# Patient Record
Sex: FEMALE | Race: WHITE | Employment: FULL TIME | URBAN - METROPOLITAN AREA
[De-identification: names, ages, dates, MRNs, and addresses within clinical notes are randomized per-mention and may not be internally consistent; named-entity substitution may affect disease eponyms.]

---

## 2023-08-26 PROBLEM — J45.909 REACTIVE AIRWAY DISEASE (HHS-HCC): Status: ACTIVE | Noted: 2023-08-26

## 2023-08-26 PROBLEM — Z00.00 ROUTINE ADULT HEALTH MAINTENANCE: Status: ACTIVE | Noted: 2023-08-26

## 2023-08-26 PROBLEM — E55.9 VITAMIN D DEFICIENCY: Status: ACTIVE | Noted: 2023-08-26

## 2023-09-15 LAB
ALANINE AMINOTRANSFERASE (SGPT) (U/L) IN SER/PLAS: 11 U/L (ref 7–45)
ALBUMIN (G/DL) IN SER/PLAS: 4.3 G/DL (ref 3.4–5)
ALKALINE PHOSPHATASE (U/L) IN SER/PLAS: 43 U/L (ref 33–110)
ANION GAP IN SER/PLAS: 12 MMOL/L (ref 10–20)
APPEARANCE, URINE: CLEAR
ASPARTATE AMINOTRANSFERASE (SGOT) (U/L) IN SER/PLAS: 17 U/L (ref 9–39)
BACTERIA, URINE: ABNORMAL /HPF
BASOPHILS (10*3/UL) IN BLOOD BY AUTOMATED COUNT: 0.07 X10E9/L (ref 0–0.1)
BASOPHILS/100 LEUKOCYTES IN BLOOD BY AUTOMATED COUNT: 1.3 % (ref 0–2)
BILIRUBIN TOTAL (MG/DL) IN SER/PLAS: 0.6 MG/DL (ref 0–1.2)
BILIRUBIN, URINE: NEGATIVE
BLOOD, URINE: NEGATIVE
CALCIDIOL (25 OH VITAMIN D3) (NG/ML) IN SER/PLAS: 84 NG/ML
CALCIUM (MG/DL) IN SER/PLAS: 9.3 MG/DL (ref 8.6–10.3)
CARBON DIOXIDE, TOTAL (MMOL/L) IN SER/PLAS: 30 MMOL/L (ref 21–32)
CHLORIDE (MMOL/L) IN SER/PLAS: 101 MMOL/L (ref 98–107)
CHOLESTEROL (MG/DL) IN SER/PLAS: 202 MG/DL (ref 0–199)
CHOLESTEROL IN HDL (MG/DL) IN SER/PLAS: 73.5 MG/DL
CHOLESTEROL/HDL RATIO: 2.7
COLOR, URINE: YELLOW
CREATININE (MG/DL) IN SER/PLAS: 0.63 MG/DL (ref 0.5–1.05)
EOSINOPHILS (10*3/UL) IN BLOOD BY AUTOMATED COUNT: 0.19 X10E9/L (ref 0–0.7)
EOSINOPHILS/100 LEUKOCYTES IN BLOOD BY AUTOMATED COUNT: 3.4 % (ref 0–6)
ERYTHROCYTE DISTRIBUTION WIDTH (RATIO) BY AUTOMATED COUNT: 12.1 % (ref 11.5–14.5)
ERYTHROCYTE MEAN CORPUSCULAR HEMOGLOBIN CONCENTRATION (G/DL) BY AUTOMATED: 32 G/DL (ref 32–36)
ERYTHROCYTE MEAN CORPUSCULAR VOLUME (FL) BY AUTOMATED COUNT: 95 FL (ref 80–100)
ERYTHROCYTES (10*6/UL) IN BLOOD BY AUTOMATED COUNT: 4.58 X10E12/L (ref 4–5.2)
GFR FEMALE: >90 ML/MIN/1.73M2
GLUCOSE (MG/DL) IN SER/PLAS: 73 MG/DL (ref 74–99)
GLUCOSE, URINE: NEGATIVE MG/DL
HEMATOCRIT (%) IN BLOOD BY AUTOMATED COUNT: 43.5 % (ref 36–46)
HEMOGLOBIN (G/DL) IN BLOOD: 13.9 G/DL (ref 12–16)
IMMATURE GRANULOCYTES/100 LEUKOCYTES IN BLOOD BY AUTOMATED COUNT: 0.2 % (ref 0–0.9)
KETONES, URINE: NEGATIVE MG/DL
LDL: 116 MG/DL (ref 0–99)
LEUKOCYTE ESTERASE, URINE: ABNORMAL
LEUKOCYTES (10*3/UL) IN BLOOD BY AUTOMATED COUNT: 5.5 X10E9/L (ref 4.4–11.3)
LYMPHOCYTES (10*3/UL) IN BLOOD BY AUTOMATED COUNT: 2.03 X10E9/L (ref 1.2–4.8)
LYMPHOCYTES/100 LEUKOCYTES IN BLOOD BY AUTOMATED COUNT: 36.8 % (ref 13–44)
MONOCYTES (10*3/UL) IN BLOOD BY AUTOMATED COUNT: 0.5 X10E9/L (ref 0.1–1)
MONOCYTES/100 LEUKOCYTES IN BLOOD BY AUTOMATED COUNT: 9.1 % (ref 2–10)
NEUTROPHILS (10*3/UL) IN BLOOD BY AUTOMATED COUNT: 2.71 X10E9/L (ref 1.2–7.7)
NEUTROPHILS/100 LEUKOCYTES IN BLOOD BY AUTOMATED COUNT: 49.2 % (ref 40–80)
NITRITE, URINE: NEGATIVE
PH, URINE: 6 (ref 5–8)
PLATELETS (10*3/UL) IN BLOOD AUTOMATED COUNT: 256 X10E9/L (ref 150–450)
POTASSIUM (MMOL/L) IN SER/PLAS: 3.9 MMOL/L (ref 3.5–5.3)
PROTEIN TOTAL: 6.9 G/DL (ref 6.4–8.2)
PROTEIN, URINE: NEGATIVE MG/DL
RBC, URINE: <1 /HPF (ref 0–5)
SODIUM (MMOL/L) IN SER/PLAS: 139 MMOL/L (ref 136–145)
SPECIFIC GRAVITY, URINE: 1.01 (ref 1–1.03)
SQUAMOUS EPITHELIAL CELLS, URINE: 3 /HPF
THYROTROPIN (MIU/L) IN SER/PLAS BY DETECTION LIMIT <= 0.05 MIU/L: 2.85 MIU/L (ref 0.44–3.98)
TRIGLYCERIDE (MG/DL) IN SER/PLAS: 63 MG/DL (ref 0–149)
UREA NITROGEN (MG/DL) IN SER/PLAS: 11 MG/DL (ref 6–23)
UROBILINOGEN, URINE: <2 MG/DL (ref 0–1.9)
VLDL: 13 MG/DL (ref 0–40)
WBC, URINE: 1 /HPF (ref 0–5)

## 2023-09-25 ASSESSMENT — PROMIS GLOBAL HEALTH SCALE
RATE_AVERAGE_PAIN: 0
RATE_PHYSICAL_HEALTH: EXCELLENT
EMOTIONAL_PROBLEMS: NEVER
CARRYOUT_PHYSICAL_ACTIVITIES: COMPLETELY
RATE_SOCIAL_SATISFACTION: EXCELLENT
RATE_MENTAL_HEALTH: EXCELLENT
RATE_QUALITY_OF_LIFE: EXCELLENT
RATE_GENERAL_HEALTH: EXCELLENT
CARRYOUT_SOCIAL_ACTIVITIES: EXCELLENT

## 2023-09-29 ENCOUNTER — OFFICE VISIT (OUTPATIENT)
Dept: PRIMARY CARE | Facility: CLINIC | Age: 48
End: 2023-09-29
Payer: COMMERCIAL

## 2023-09-29 VITALS
HEART RATE: 63 BPM | SYSTOLIC BLOOD PRESSURE: 124 MMHG | WEIGHT: 113 LBS | HEIGHT: 62 IN | BODY MASS INDEX: 20.8 KG/M2 | DIASTOLIC BLOOD PRESSURE: 80 MMHG

## 2023-09-29 DIAGNOSIS — Z12.31 ENCOUNTER FOR SCREENING MAMMOGRAM FOR BREAST CANCER: ICD-10-CM

## 2023-09-29 DIAGNOSIS — Z82.49 FAMILY HISTORY OF CORONARY ARTERY DISEASE IN FATHER: ICD-10-CM

## 2023-09-29 DIAGNOSIS — Z00.00 ROUTINE ADULT HEALTH MAINTENANCE: Primary | ICD-10-CM

## 2023-09-29 DIAGNOSIS — Z13.6 SCREENING FOR CARDIOVASCULAR CONDITION: ICD-10-CM

## 2023-09-29 DIAGNOSIS — Z23 NEED FOR VACCINATION: ICD-10-CM

## 2023-09-29 DIAGNOSIS — E55.9 VITAMIN D DEFICIENCY: ICD-10-CM

## 2023-09-29 PROCEDURE — 90686 IIV4 VACC NO PRSV 0.5 ML IM: CPT | Performed by: INTERNAL MEDICINE

## 2023-09-29 PROCEDURE — 90471 IMMUNIZATION ADMIN: CPT | Performed by: INTERNAL MEDICINE

## 2023-09-29 PROCEDURE — 1036F TOBACCO NON-USER: CPT | Performed by: INTERNAL MEDICINE

## 2023-09-29 PROCEDURE — 99396 PREV VISIT EST AGE 40-64: CPT | Performed by: INTERNAL MEDICINE

## 2023-09-29 RX ORDER — CHOLECALCIFEROL (VITAMIN D3) 125 MCG
125 CAPSULE ORAL DAILY
COMMUNITY
Start: 2018-11-29

## 2023-09-29 ASSESSMENT — PATIENT HEALTH QUESTIONNAIRE - PHQ9
1. LITTLE INTEREST OR PLEASURE IN DOING THINGS: NOT AT ALL
2. FEELING DOWN, DEPRESSED OR HOPELESS: NOT AT ALL
SUM OF ALL RESPONSES TO PHQ9 QUESTIONS 1 AND 2: 0

## 2023-09-29 NOTE — ASSESSMENT & PLAN NOTE
Annual Wellness exam completed   Preventive Health history reviewed:  Labs ordered    Mammogram ordered  Flu vaccine

## 2023-09-29 NOTE — PROGRESS NOTES
Reason for Visit: Annual Physical Exam    Assessment and Plan:  Problem List Items Addressed This Visit          High    Routine adult health maintenance - Primary    Overview     Influenza 12/2/19, 9/24/20  Moderna COVID 19 vaccine 1/16/21, 2/14/21, 11/13/21  MMR 8/22/91  Hep B 10/18/18, 11/18/18. 4/18/19  Hepatitis A 10/6/2010, 11/21/02  Yellow Fever and Typhoid vaccines  11/21/02  PAP/HPV 11/6/17 (-); 9/2022 (-); 9/16/22 (-)  Mamm 1/18 (-), 1/18/19; 1/20, 1/28/21, 1/7/22, 1/17/23  TDaP 2010, 12/2/19  PPD (-) 8/29/22  Cscope 8/30/22 (10yrs)         Current Assessment & Plan     Annual Wellness exam completed   Preventive Health history reviewed:  Labs ordered    Mammogram ordered  Flu vaccine            Relevant Orders    Urinalysis with Reflex Microscopic    TSH with reflex to Free T4 if abnormal    Comprehensive Metabolic Panel    CBC and Auto Differential    Lipid Panel       Medium    Encounter for screening mammogram for breast cancer    Relevant Orders    BI mammo bilateral screening tomosynthesis    Vitamin D deficiency    Overview     Comment on above: Goal 40-50On supplement;         Relevant Orders    Vitamin D 25-Hydroxy,Total (for eval of Vitamin D levels)     Other Visit Diagnoses       Need for vaccination        Relevant Medications    zoster vaccine-recombinant adjuvanted (Shingrix) 50 mcg/0.5 mL vaccine    Other Relevant Orders    Flu vaccine (IIV4) age 6 months and greater, preservative free (Completed)    Screening for cardiovascular condition        Relevant Orders    CT cardiac scoring wo IV contrast    Family history of coronary artery disease in father                  HPI:denies any concerns   Labs reviewed:  Component      Latest Ref Rng 9/15/2023   WBC      4.4 - 11.3 x10E9/L 5.5    RBC      4.00 - 5.20 x10E12/L 4.58    HEMOGLOBIN      12.0 - 16.0 g/dL 13.9    HEMATOCRIT      36.0 - 46.0 % 43.5    MCV      80 - 100 fL 95    MCHC      32.0 - 36.0 g/dL 32.0    Platelets      150 - 450  x10E9/L 256    RED CELL DISTRIBUTION WIDTH      11.5 - 14.5 % 12.1    Neutrophils %      40.0 - 80.0 % 49.2    Immature Granulocytes %, Automated      0.0 - 0.9 % 0.2    Lymphocytes %      13.0 - 44.0 % 36.8    Monocytes %      2.0 - 10.0 % 9.1    Eosinophils %      0.0 - 6.0 % 3.4    Basophils %      0.0 - 2.0 % 1.3    Neutrophils Absolute      1.20 - 7.70 x10E9/L 2.71    Lymphocytes Absolute      1.20 - 4.80 x10E9/L 2.03    Monocytes Absolute      0.10 - 1.00 x10E9/L 0.50    Eosinophils Absolute      0.00 - 0.70 x10E9/L 0.19    Basophils Absolute      0.00 - 0.10 x10E9/L 0.07    GLUCOSE      74 - 99 mg/dL 73 (L)    SODIUM      136 - 145 mmol/L 139    POTASSIUM      3.5 - 5.3 mmol/L 3.9    CHLORIDE      98 - 107 mmol/L 101    Bicarbonate      21 - 32 mmol/L 30    Anion Gap      10 - 20 mmol/L 12    Blood Urea Nitrogen      6 - 23 mg/dL 11    Creatinine      0.50 - 1.05 mg/dL 0.63    GFR Female      >90 mL/min/1.73m2 >90    Calcium      8.6 - 10.3 mg/dL 9.3    Albumin      3.4 - 5.0 g/dL 4.3    Alkaline Phosphatase      33 - 110 U/L 43    Total Protein      6.4 - 8.2 g/dL 6.9    AST      9 - 39 U/L 17    Bilirubin Total      0.0 - 1.2 mg/dL 0.6    ALT      7 - 45 U/L 11    Color, Urine      STRAW,YELLOW  YELLOW    Appearance, Urine      CLEAR  CLEAR    Specific Gravity, Urine      1.005 - 1.035  1.010    pH, Urine      5.0 - 8.0  6.0    Protein, Urine      NEGATIVE mg/dL NEGATIVE    Glucose, Urine      NEGATIVE mg/dL NEGATIVE    Blood, Urine      NEGATIVE  NEGATIVE    Ketones, Urine      NEGATIVE mg/dL NEGATIVE    Bilirubin, Urine      NEGATIVE  NEGATIVE    Urobilinogen, Urine      0.0 - 1.9 mg/dL <2.0    Nitrite, Urine      NEGATIVE  NEGATIVE    Leukocyte Esterase, Urine      NEGATIVE  TRACE !    CHOLESTEROL      0 - 199 mg/dL 202 (H)    HDL CHOLESTEROL      mg/dL 73.5    Cholesterol/HDL Ratio 2.7    LDL      0 - 99 mg/dL 116 (H)    VLDL      0 - 40 mg/dL 13    TRIGLYCERIDES      0 - 149 mg/dL 63    WBC, Urine       "0 - 5 /HPF 1    RBC, Urine      0 - 5 /HPF <1    Squamous Epithelial Cells, Urine      /HPF 3    Bacteria, Urine      /HPF 1+ !    Vitamin D, 25-Hydroxy, Total      ng/mL 84    Thyroid Stimulating Hormone      0.44 - 3.98 mIU/L 2.85         ROS otherwise negative aside from what was mentioned above in HPI.    Vitals  /80   Pulse 63   Ht 1.575 m (5' 2\")   Wt 51.3 kg (113 lb)   BMI 20.67 kg/m²   Body mass index is 20.67 kg/m².  Physical Exam  Gen: Alert, NAD  HEENT:  Normal exam  Neck:  No masses/nodes palpable; Thyroid nontender and without nodules; No KERRY  Respiratory:  Lungs CTAB  CV: RRR  Neuro:  Gross motor and sensory intact  Skin:  No suspicious lesions present  Breast: No masses or axillary lymphadenopathy  Gyn: Normal pelvic exam: no uterine masses or cervical lesions, or CMT; no vaginal D/C. No ovarian or adnexal masses; No external vaginal or anal/perineal lesions (Pt declined chaperone)      Active Problem List  Patient Active Problem List   Diagnosis    Routine adult health maintenance    Vitamin D deficiency    Reactive airway disease    Encounter for screening mammogram for breast cancer       Comprehensive Medical/Surgical/Social/Family History  Past Medical History:   Diagnosis Date    H/O chest x-ray     ; normal    H/O echocardiogram     1987: MVP, no MR : normal EF 55%, mild MR. no MVP     Past Surgical History:   Procedure Laterality Date    COLONOSCOPY  2022: normal    LASIK  2019    Eye surgery    OTHER SURGICAL HISTORY  2018    Bartholin's cyst excision     Social History     Social History Narrative    , 3 kids    [facility]    Nonsmoker    Social ETOH    ---    Family History:    M: HTN    F: ETOH, CAD age 60s ( age 66)    B: HPL    MGF: Heart                                           MGM:Stroke                                                          PGM: Stroke                 MGF:  Coronary Artery DiseaseMI "  age 72       Allergies and Medications  Patient has no known allergies.  Current Outpatient Medications   Medication Instructions    cholecalciferol (VITAMIN D-3) 125 mcg, oral, Daily    zoster vaccine-recombinant adjuvanted (Shingrix) 50 mcg/0.5 mL vaccine 0.5 mL, intramuscular, Once

## 2023-10-26 ENCOUNTER — HOSPITAL ENCOUNTER (OUTPATIENT)
Dept: RADIOLOGY | Facility: HOSPITAL | Age: 48
Discharge: HOME | End: 2023-10-26
Payer: COMMERCIAL

## 2023-10-26 DIAGNOSIS — Z13.6 SCREENING FOR CARDIOVASCULAR CONDITION: ICD-10-CM

## 2023-10-26 PROCEDURE — 75571 CT HRT W/O DYE W/CA TEST: CPT

## 2024-01-19 ENCOUNTER — HOSPITAL ENCOUNTER (OUTPATIENT)
Dept: RADIOLOGY | Facility: CLINIC | Age: 49
Discharge: HOME | End: 2024-01-19
Payer: COMMERCIAL

## 2024-01-19 DIAGNOSIS — Z12.31 ENCOUNTER FOR SCREENING MAMMOGRAM FOR BREAST CANCER: ICD-10-CM

## 2024-01-19 PROCEDURE — 77067 SCR MAMMO BI INCL CAD: CPT | Mod: BILATERAL PROCEDURE | Performed by: RADIOLOGY

## 2024-01-19 PROCEDURE — 77063 BREAST TOMOSYNTHESIS BI: CPT | Mod: BILATERAL PROCEDURE | Performed by: RADIOLOGY

## 2024-01-19 PROCEDURE — 77067 SCR MAMMO BI INCL CAD: CPT

## 2024-10-10 DIAGNOSIS — Z00.00 ROUTINE ADULT HEALTH MAINTENANCE: ICD-10-CM

## 2024-10-10 DIAGNOSIS — E55.9 VITAMIN D DEFICIENCY: ICD-10-CM

## 2024-10-11 ENCOUNTER — LAB (OUTPATIENT)
Dept: LAB | Facility: LAB | Age: 49
End: 2024-10-11
Payer: COMMERCIAL

## 2024-10-11 DIAGNOSIS — Z00.00 ROUTINE ADULT HEALTH MAINTENANCE: ICD-10-CM

## 2024-10-11 DIAGNOSIS — E55.9 VITAMIN D DEFICIENCY: ICD-10-CM

## 2024-10-11 LAB
25(OH)D3 SERPL-MCNC: 100 NG/ML (ref 30–100)
ALBUMIN SERPL BCP-MCNC: 4.2 G/DL (ref 3.4–5)
ALP SERPL-CCNC: 47 U/L (ref 33–110)
ALT SERPL W P-5'-P-CCNC: 9 U/L (ref 7–45)
ANION GAP SERPL CALC-SCNC: 12 MMOL/L (ref 10–20)
APPEARANCE UR: CLEAR
AST SERPL W P-5'-P-CCNC: 13 U/L (ref 9–39)
BASOPHILS # BLD AUTO: 0.05 X10*3/UL (ref 0–0.1)
BASOPHILS NFR BLD AUTO: 0.9 %
BILIRUB SERPL-MCNC: 0.7 MG/DL (ref 0–1.2)
BILIRUB UR STRIP.AUTO-MCNC: NEGATIVE MG/DL
BUN SERPL-MCNC: 10 MG/DL (ref 6–23)
CALCIUM SERPL-MCNC: 9.1 MG/DL (ref 8.6–10.6)
CHLORIDE SERPL-SCNC: 100 MMOL/L (ref 98–107)
CHOLEST SERPL-MCNC: 172 MG/DL (ref 0–199)
CHOLESTEROL/HDL RATIO: 2.3
CO2 SERPL-SCNC: 29 MMOL/L (ref 21–32)
COLOR UR: YELLOW
CREAT SERPL-MCNC: 0.68 MG/DL (ref 0.5–1.05)
EGFRCR SERPLBLD CKD-EPI 2021: >90 ML/MIN/1.73M*2
EOSINOPHIL # BLD AUTO: 0.21 X10*3/UL (ref 0–0.7)
EOSINOPHIL NFR BLD AUTO: 4 %
ERYTHROCYTE [DISTWIDTH] IN BLOOD BY AUTOMATED COUNT: 11.6 % (ref 11.5–14.5)
GLUCOSE SERPL-MCNC: 88 MG/DL (ref 74–99)
GLUCOSE UR STRIP.AUTO-MCNC: NORMAL MG/DL
HCT VFR BLD AUTO: 41.2 % (ref 36–46)
HDLC SERPL-MCNC: 75.7 MG/DL
HGB BLD-MCNC: 13.4 G/DL (ref 12–16)
IMM GRANULOCYTES # BLD AUTO: 0.01 X10*3/UL (ref 0–0.7)
IMM GRANULOCYTES NFR BLD AUTO: 0.2 % (ref 0–0.9)
KETONES UR STRIP.AUTO-MCNC: NEGATIVE MG/DL
LDLC SERPL CALC-MCNC: 86 MG/DL
LEUKOCYTE ESTERASE UR QL STRIP.AUTO: NEGATIVE
LYMPHOCYTES # BLD AUTO: 1.88 X10*3/UL (ref 1.2–4.8)
LYMPHOCYTES NFR BLD AUTO: 35.7 %
MCH RBC QN AUTO: 30.3 PG (ref 26–34)
MCHC RBC AUTO-ENTMCNC: 32.5 G/DL (ref 32–36)
MCV RBC AUTO: 93 FL (ref 80–100)
MONOCYTES # BLD AUTO: 0.55 X10*3/UL (ref 0.1–1)
MONOCYTES NFR BLD AUTO: 10.4 %
NEUTROPHILS # BLD AUTO: 2.57 X10*3/UL (ref 1.2–7.7)
NEUTROPHILS NFR BLD AUTO: 48.8 %
NITRITE UR QL STRIP.AUTO: NEGATIVE
NON HDL CHOLESTEROL: 96 MG/DL (ref 0–149)
NRBC BLD-RTO: 0 /100 WBCS (ref 0–0)
PH UR STRIP.AUTO: 5 [PH]
PLATELET # BLD AUTO: 221 X10*3/UL (ref 150–450)
POTASSIUM SERPL-SCNC: 3.8 MMOL/L (ref 3.5–5.3)
PROT SERPL-MCNC: 6.5 G/DL (ref 6.4–8.2)
PROT UR STRIP.AUTO-MCNC: NEGATIVE MG/DL
RBC # BLD AUTO: 4.42 X10*6/UL (ref 4–5.2)
RBC # UR STRIP.AUTO: NEGATIVE /UL
SODIUM SERPL-SCNC: 137 MMOL/L (ref 136–145)
SP GR UR STRIP.AUTO: 1.02
TRIGL SERPL-MCNC: 50 MG/DL (ref 0–149)
TSH SERPL-ACNC: 2.95 MIU/L (ref 0.44–3.98)
UROBILINOGEN UR STRIP.AUTO-MCNC: NORMAL MG/DL
VLDL: 10 MG/DL (ref 0–40)
WBC # BLD AUTO: 5.3 X10*3/UL (ref 4.4–11.3)

## 2024-10-11 PROCEDURE — 85025 COMPLETE CBC W/AUTO DIFF WBC: CPT

## 2024-10-11 PROCEDURE — 36415 COLL VENOUS BLD VENIPUNCTURE: CPT

## 2024-10-11 PROCEDURE — 80053 COMPREHEN METABOLIC PANEL: CPT

## 2024-10-11 PROCEDURE — 80061 LIPID PANEL: CPT

## 2024-10-11 PROCEDURE — 84443 ASSAY THYROID STIM HORMONE: CPT

## 2024-10-11 PROCEDURE — 81003 URINALYSIS AUTO W/O SCOPE: CPT

## 2024-10-11 PROCEDURE — 82306 VITAMIN D 25 HYDROXY: CPT

## 2024-10-17 ASSESSMENT — PROMIS GLOBAL HEALTH SCALE
CARRYOUT_SOCIAL_ACTIVITIES: EXCELLENT
RATE_PHYSICAL_HEALTH: EXCELLENT
RATE_SOCIAL_SATISFACTION: EXCELLENT
RATE_QUALITY_OF_LIFE: EXCELLENT
CARRYOUT_PHYSICAL_ACTIVITIES: COMPLETELY
EMOTIONAL_PROBLEMS: RARELY
RATE_MENTAL_HEALTH: EXCELLENT
RATE_GENERAL_HEALTH: EXCELLENT
RATE_AVERAGE_PAIN: 0

## 2024-10-18 ENCOUNTER — APPOINTMENT (OUTPATIENT)
Dept: PRIMARY CARE | Facility: CLINIC | Age: 49
End: 2024-10-18
Payer: COMMERCIAL

## 2024-10-18 VITALS
HEART RATE: 74 BPM | BODY MASS INDEX: 20.52 KG/M2 | WEIGHT: 111.5 LBS | TEMPERATURE: 97.5 F | DIASTOLIC BLOOD PRESSURE: 71 MMHG | HEIGHT: 62 IN | OXYGEN SATURATION: 98 % | SYSTOLIC BLOOD PRESSURE: 119 MMHG

## 2024-10-18 DIAGNOSIS — Z12.31 ENCOUNTER FOR SCREENING MAMMOGRAM FOR BREAST CANCER: Primary | ICD-10-CM

## 2024-10-18 DIAGNOSIS — Z00.00 ROUTINE ADULT HEALTH MAINTENANCE: ICD-10-CM

## 2024-10-18 DIAGNOSIS — J45.20 MILD INTERMITTENT REACTIVE AIRWAY DISEASE WITHOUT COMPLICATION (HHS-HCC): ICD-10-CM

## 2024-10-18 DIAGNOSIS — E55.9 VITAMIN D DEFICIENCY: ICD-10-CM

## 2024-10-18 PROCEDURE — 1036F TOBACCO NON-USER: CPT | Performed by: INTERNAL MEDICINE

## 2024-10-18 PROCEDURE — 99396 PREV VISIT EST AGE 40-64: CPT | Performed by: INTERNAL MEDICINE

## 2024-10-18 PROCEDURE — 90471 IMMUNIZATION ADMIN: CPT | Performed by: INTERNAL MEDICINE

## 2024-10-18 PROCEDURE — 3008F BODY MASS INDEX DOCD: CPT | Performed by: INTERNAL MEDICINE

## 2024-10-18 PROCEDURE — 90656 IIV3 VACC NO PRSV 0.5 ML IM: CPT | Performed by: INTERNAL MEDICINE

## 2024-10-18 ASSESSMENT — PATIENT HEALTH QUESTIONNAIRE - PHQ9
SUM OF ALL RESPONSES TO PHQ9 QUESTIONS 1 AND 2: 0
1. LITTLE INTEREST OR PLEASURE IN DOING THINGS: NOT AT ALL
2. FEELING DOWN, DEPRESSED OR HOPELESS: NOT AT ALL

## 2024-10-18 NOTE — PROGRESS NOTES
ANNUAL CPE VISIT  Chief Complaint   Patient presents with    Annual Exam     Would like a flu shot     HPI: No new issues  Labs reviewed:  Component      Latest Ref Rng 10/11/2024   WBC      4.4 - 11.3 x10*3/uL 5.3    nRBC      0.0 - 0.0 /100 WBCs 0.0    RBC      4.00 - 5.20 x10*6/uL 4.42    HEMOGLOBIN      12.0 - 16.0 g/dL 13.4    HEMATOCRIT      36.0 - 46.0 % 41.2    MCV      80 - 100 fL 93    MCH      26.0 - 34.0 pg 30.3    MCHC      32.0 - 36.0 g/dL 32.5    RED CELL DISTRIBUTION WIDTH      11.5 - 14.5 % 11.6    Platelets      150 - 450 x10*3/uL 221    Neutrophils %      40.0 - 80.0 % 48.8    Immature Granulocytes %, Automated      0.0 - 0.9 % 0.2    Lymphocytes %      13.0 - 44.0 % 35.7    Monocytes %      2.0 - 10.0 % 10.4    Eosinophils %      0.0 - 6.0 % 4.0    Basophils %      0.0 - 2.0 % 0.9    Neutrophils Absolute      1.20 - 7.70 x10*3/uL 2.57    Immature Granulocytes Absolute, Automated      0.00 - 0.70 x10*3/uL 0.01    Lymphocytes Absolute      1.20 - 4.80 x10*3/uL 1.88    Monocytes Absolute      0.10 - 1.00 x10*3/uL 0.55    Eosinophils Absolute      0.00 - 0.70 x10*3/uL 0.21    Basophils Absolute      0.00 - 0.10 x10*3/uL 0.05    GLUCOSE      74 - 99 mg/dL 88    SODIUM      136 - 145 mmol/L 137    POTASSIUM      3.5 - 5.3 mmol/L 3.8    CHLORIDE      98 - 107 mmol/L 100    Bicarbonate      21 - 32 mmol/L 29    Anion Gap      10 - 20 mmol/L 12    Blood Urea Nitrogen      6 - 23 mg/dL 10    Creatinine      0.50 - 1.05 mg/dL 0.68    EGFR      >60 mL/min/1.73m*2 >90    Calcium      8.6 - 10.6 mg/dL 9.1    Albumin      3.4 - 5.0 g/dL 4.2    Alkaline Phosphatase      33 - 110 U/L 47    Total Protein      6.4 - 8.2 g/dL 6.5    AST      9 - 39 U/L 13    Bilirubin Total      0.0 - 1.2 mg/dL 0.7    ALT      7 - 45 U/L 9    Color, Urine      Light-Yellow, Yellow, Dark-Yellow  Yellow    Appearance, Urine      Clear  Clear    Specific Gravity, Urine      1.005 - 1.035  1.023    pH, Urine      5.0, 5.5, 6.0, 6.5,  7.0, 7.5, 8.0  5.0    Protein, Urine      NEGATIVE, 10 (TRACE), 20 (TRACE) mg/dL NEGATIVE    Glucose, Urine      Normal mg/dL Normal    Blood, Urine      NEGATIVE  NEGATIVE    Ketones, Urine      NEGATIVE mg/dL NEGATIVE    Bilirubin, Urine      NEGATIVE  NEGATIVE    Urobilinogen, Urine      Normal mg/dL Normal    Nitrite, Urine      NEGATIVE  NEGATIVE    Leukocyte Esterase, Urine      NEGATIVE  NEGATIVE    CHOLESTEROL      0 - 199 mg/dL 172    HDL CHOLESTEROL      mg/dL 75.7    Cholesterol/HDL Ratio 2.3    LDL Calculated      <=99 mg/dL 86    VLDL      0 - 40 mg/dL 10    TRIGLYCERIDES      0 - 149 mg/dL 50    Non HDL Cholesterol      0 - 149 mg/dL 96    Vitamin D, 25-Hydroxy, Total      30 - 100 ng/mL 100    Thyroid Stimulating Hormone      0.44 - 3.98 mIU/L 2.95          Assessment and Plan:  Problem List Items Addressed This Visit          High    Routine adult health maintenance    Overview     Influenza 12/2/19, 9/24/20, 10/18/24  Moderna COVID 19 vaccine 1/16/21, 2/14/21, 11/13/21  Shingrix 12/26/23, 2/25/24  MMR 8/22/91  Hep B 10/18/18, 11/18/18. 4/18/19  Hepatitis A 10/6/2010, 11/21/02  Yellow Fever and Typhoid vaccines  11/21/02  PAP/HPV 11/6/17 (-); 9/2022 (-); 9/16/22 (-)  Mamm 1/18 (-), 1/18/19; 1/20, 1/28/21, 1/7/22, 1/17/23, 1/23/24  TDaP 2010, 12/2/19  PPD (-) 8/29/22  Cscope 8/30/22 (10yrs)  10/26/23: CT Calcium score=0         Relevant Orders    TSH with reflex to Free T4 if abnormal    Comprehensive Metabolic Panel    CBC and Auto Differential    Lipid Panel    Urinalysis with Reflex Microscopic       Medium    Encounter for screening mammogram for breast cancer - Primary    Relevant Orders    BI mammo bilateral screening tomosynthesis    Reactive airway disease (HHS-HCC)    Overview     Comment on above: prn albuterol;         Vitamin D deficiency    Overview     11/21/18: 21  Goal 40-50  On supplement  On 5K daily was 100         Current Assessment & Plan     Decrease to QOD         Relevant  "Orders    Vitamin D 25-Hydroxy,Total (for eval of Vitamin D levels)       ROS otherwise negative aside from what was mentioned above in HPI.    Vitals  /71   Pulse 74   Temp 36.4 °C (97.5 °F)   Ht 1.575 m (5' 2\")   Wt 50.6 kg (111 lb 8 oz)   SpO2 98%   BMI 20.39 kg/m²   Body mass index is 20.39 kg/m².  Physical Exam  Gen: Alert, NAD  HEENT:  Normal exam  Neck:  No masses/nodes palpable; Thyroid nontender and without nodules; No KERRY  Respiratory:  Lungs CTAB  CV: RRR  Neuro:  Gross motor and sensory intact  Skin:  No suspicious lesions present  Breast: No masses or axillary lymphadenopathy      Allergies and Medications  Patient has no known allergies.  Current Outpatient Medications   Medication Instructions    cholecalciferol (VITAMIN D-3) 125 mcg, oral, Every other day       Active Problem List  Patient Active Problem List   Diagnosis    Routine adult health maintenance    Vitamin D deficiency    Reactive airway disease (HHS-HCC)    Encounter for screening mammogram for breast cancer       Comprehensive Medical/Surgical/Social/Family History  Past Medical History:   Diagnosis Date    H/O chest x-ray     ; normal    H/O CT scan 10/26/2023    1. Coronary artery calcium score of 0*.    H/O echocardiogram     : MVP, no MR : normal EF 55%, mild MR. no MVP     Past Surgical History:   Procedure Laterality Date    COLONOSCOPY  2022: normal    LASIK  2019    Eye surgery    OTHER SURGICAL HISTORY  2018    Bartholin's cyst excision       Social History     Social History Narrative    Social History:    , 3 kids    [facility]    Nonsmoker    Social ETOH    ---    Family History:    M: HTN    F: ETOH/Smoker, CAD age 60s ( age 66)    B: HPL    MGF: Heart                                           MGM:Stroke                                                          PGM: Stroke                 MGF:  Coronary Artery Disease/MI  age 72     "

## 2025-01-24 ENCOUNTER — HOSPITAL ENCOUNTER (OUTPATIENT)
Dept: RADIOLOGY | Facility: CLINIC | Age: 50
Discharge: HOME | End: 2025-01-24
Payer: COMMERCIAL

## 2025-01-24 VITALS — HEIGHT: 62 IN | WEIGHT: 111.5 LBS | BODY MASS INDEX: 20.52 KG/M2

## 2025-01-24 DIAGNOSIS — Z12.31 ENCOUNTER FOR SCREENING MAMMOGRAM FOR BREAST CANCER: ICD-10-CM

## 2025-01-24 PROCEDURE — 77067 SCR MAMMO BI INCL CAD: CPT

## 2025-10-31 ENCOUNTER — APPOINTMENT (OUTPATIENT)
Dept: PRIMARY CARE | Facility: CLINIC | Age: 50
End: 2025-10-31
Payer: COMMERCIAL